# Patient Record
Sex: MALE | Race: ASIAN | NOT HISPANIC OR LATINO | URBAN - METROPOLITAN AREA
[De-identification: names, ages, dates, MRNs, and addresses within clinical notes are randomized per-mention and may not be internally consistent; named-entity substitution may affect disease eponyms.]

---

## 2020-05-27 ENCOUNTER — VIRTUAL VISIT (OUTPATIENT)
Dept: FAMILY MEDICINE | Facility: OTHER | Age: 35
End: 2020-05-27

## 2020-05-28 NOTE — PROGRESS NOTES
"Date: 2020 13:15:35  Clinician: Donny Abel  Clinician NPI: [id number]  Patient: Lamine Boland  Patient : 1985  Patient Address: [address] [place] [address]  Patient Phone: [phone]  Visit Protocol: URI  Patient Summary:  Lamine is a 35 year old ( : 1985 ) male who initiated a Visit for COVID-19 (Coronavirus) evaluation and screening. When asked the question \"Please sign me up to receive news, health information and promotions. \", Lamine responded \"Yes\".    Lamine does not have any symptoms. Lamine denies having wheezing, nausea, teeth pain, ageusia, diarrhea, sore throat, enlarged lymph nodes, malaise, myalgias, anosmia, facial pain or pressure, fever, cough, nasal congestion, vomiting, rhinitis, ear pain, headache, and chills. He also denies having recent facial or sinus surgery in the past 60 days and taking antibiotic medication for the symptoms. He is not experiencing dyspnea.    Pertinent COVID-19 (Coronavirus) information  In the past 14 days, Lamine has not worked in a congregate living setting.   He does not work or volunteer as healthcare worker or a  and does not work or volunteer in a healthcare facility.   Lamine also has not lived in a congregate living setting in the past 14 days. He does not live with a healthcare worker.   Lamine has had a close contact with a laboratory-confirmed COVID-19 patient within 14 days of symptom onset. Additional information about contact with COVID-19 (Coronavirus) patient as reported by the patient (free text): My brother law test for the virus has came back positive   Pertinent medical history  Lamine needs a return to work/school note.   Weight: 160 lbs   Lamine does not smoke or use smokeless tobacco.   Weight: 160 lbs    MEDICATIONS: No current medications, ALLERGIES: NKDA  Clinician Response:  Dear Lamine,      Based on the details you've shared, you may have been exposed to coronavirus (COVID-19). You " do not need to be tested at this time.  What should I do?  For safety, it's very important to follow these rules. Do this for 14 days after the date you were last exposed to the virus.   Stay home and away from others. Don't go to work, school or anywhere else.    No hugging, kissing or shaking hands.   Don't let anyone visit.   Cover your mouth and nose with a mask, tissue or washcloth to avoid spreading germs.   Wash your hands and face often. Use soap and water.   What are the symptoms of COVID-19?  The most common symptoms are cough, fever and trouble breathing. After 14 days, if there's still no cough or fever, you likely don't have this virus.  If you develop a cough or fever, follow these guidelines.   If you're normally healthy: Please start another [facility] visit to report your symptoms. Go to [email or web address].   If you have a serious health problem (like cancer, heart failure, an organ transplant or kidney disease): Call your specialty clinic. Let them know that you might have COVID-19.    Where can I get more information?  To learn more about COVID-19 and how to care for yourself at home, please visit the CDC website at https://www.cdc.gov/coronavirus/2019-ncov/about/steps-when-sick.html.  For more about your care at [facility], please visit [email or web address]  If you are interested in becoming part of a [facility] clinic trial related to COVID19 please go to [email or web address] for information, if you qualify.   .      Diagnosis: Worries  Diagnosis ICD: R45.82